# Patient Record
Sex: FEMALE | Race: WHITE | NOT HISPANIC OR LATINO | URBAN - NONMETROPOLITAN AREA
[De-identification: names, ages, dates, MRNs, and addresses within clinical notes are randomized per-mention and may not be internally consistent; named-entity substitution may affect disease eponyms.]

---

## 2024-09-30 ENCOUNTER — LAB OUTSIDE AN ENCOUNTER (OUTPATIENT)
Dept: URBAN - NONMETROPOLITAN AREA CLINIC 2 | Facility: CLINIC | Age: 69
End: 2024-09-30

## 2024-09-30 ENCOUNTER — OFFICE VISIT (OUTPATIENT)
Dept: URBAN - NONMETROPOLITAN AREA CLINIC 2 | Facility: CLINIC | Age: 69
End: 2024-09-30

## 2024-09-30 ENCOUNTER — DASHBOARD ENCOUNTERS (OUTPATIENT)
Age: 69
End: 2024-09-30

## 2024-09-30 VITALS
HEART RATE: 70 BPM | HEIGHT: 70 IN | BODY MASS INDEX: 20.9 KG/M2 | SYSTOLIC BLOOD PRESSURE: 137 MMHG | DIASTOLIC BLOOD PRESSURE: 81 MMHG | WEIGHT: 146 LBS | TEMPERATURE: 98 F

## 2024-09-30 PROBLEM — 85057007: Status: ACTIVE | Noted: 2024-09-30

## 2024-09-30 PROBLEM — 16331000: Status: ACTIVE | Noted: 2024-09-30

## 2024-09-30 PROBLEM — 31258000: Status: ACTIVE | Noted: 2024-09-30

## 2024-09-30 PROBLEM — 305058001: Status: ACTIVE | Noted: 2024-09-30

## 2024-09-30 PROBLEM — 116289008: Status: ACTIVE | Noted: 2024-09-30

## 2024-09-30 RX ORDER — PHENOL 1.4 %
TAKE 1 TABLET BY ORAL ROUTE AS NEEDED AEROSOL, SPRAY (ML) MUCOUS MEMBRANE
Qty: 0 | Refills: 0 | Status: ACTIVE | COMMUNITY
Start: 1900-01-01

## 2024-09-30 RX ORDER — ESCITALOPRAM OXALATE 10 MG/1
1 TABLET TABLET ORAL ONCE A DAY
Status: ACTIVE | COMMUNITY

## 2024-09-30 RX ORDER — LORATADINE 10 MG
1 TABLET TABLET ORAL ONCE A DAY
Status: ACTIVE | COMMUNITY

## 2024-09-30 RX ORDER — ROSUVASTATIN CALCIUM 5 MG/1
1 TABLET TABLET, COATED ORAL ONCE A DAY
Status: ACTIVE | COMMUNITY

## 2024-09-30 NOTE — HPI-TODAY'S VISIT:
9/30/2024 Alexandre presents to clinic for evaluation of heartburn, increased gas and bloating.  This started a few months ago.  She notes increased excessive belching abdominal bloating.  She has occasional heartburn and has tried over-the-counter Gas-X, probiotics and digestive enzymes.  She was started on famotidine with her PCP approximately a week ago and has not noted any symptom improvement.  Her clothing does not fit like it used to and she reports weight gain.  She also notes recent change in her bowel habits with less stool production.  She still at her baseline every other day bowel movement however she has less output.  She purchased a squatty potty and did not observe any improvement.  She has also been using Metamucil. She denies blood in her stool, dysphagia, reflux.  She states she was told in the past she had a hiatal hernia and questions any involvement.  She does endorse enjoyment of dairy products in her diet.  She denies use of artificial sweeteners any travel or illnesses in the last year.  She has not had any medication changes.  She completed lab work with PCP which showed subclinical hypothyroidism. Based the abdominal bloating along with on some right lower quadrant/pelvis discomfort she completed an MRI of her abdomen and pelvis with her PCP, showing inguinal hernia and incidental findings of pancreatic cyst and liver lesion. She is due to repeat her screening colonoscopy next year, previously completed EGD and colonoscopy with Dr. Benton. Today we discussed moving forward with an EGD with biopsies to rule out H. pylori. She will continue famotidine and start MiraLAX and 2 tablets of psyllium fiber at bedtime, trial Lactaid with dairy. TAMIKA

## 2024-10-28 ENCOUNTER — OFFICE VISIT (OUTPATIENT)
Dept: URBAN - NONMETROPOLITAN AREA SURGERY CENTER 1 | Facility: SURGERY CENTER | Age: 69
End: 2024-10-28

## 2024-10-28 ENCOUNTER — TELEPHONE ENCOUNTER (OUTPATIENT)
Dept: URBAN - NONMETROPOLITAN AREA CLINIC 2 | Facility: CLINIC | Age: 69
End: 2024-10-28

## 2024-10-28 RX ORDER — ROSUVASTATIN CALCIUM 5 MG/1
1 TABLET TABLET, COATED ORAL ONCE A DAY
Status: ACTIVE | COMMUNITY

## 2024-10-28 RX ORDER — PHENOL 1.4 %
TAKE 1 TABLET BY ORAL ROUTE AS NEEDED AEROSOL, SPRAY (ML) MUCOUS MEMBRANE
Qty: 0 | Refills: 0 | Status: ACTIVE | COMMUNITY
Start: 1900-01-01

## 2024-10-28 RX ORDER — LORATADINE 10 MG
1 TABLET TABLET ORAL ONCE A DAY
Status: ACTIVE | COMMUNITY

## 2024-10-28 RX ORDER — ESCITALOPRAM OXALATE 10 MG/1
1 TABLET TABLET ORAL ONCE A DAY
Status: ACTIVE | COMMUNITY

## 2024-10-28 RX ORDER — PANTOPRAZOLE SODIUM 20 MG/1
1 TABLET TABLET, DELAYED RELEASE ORAL ONCE A DAY
Qty: 90 TABLET | Refills: 3 | OUTPATIENT
Start: 2024-10-28

## 2024-11-25 ENCOUNTER — LAB OUTSIDE AN ENCOUNTER (OUTPATIENT)
Dept: URBAN - NONMETROPOLITAN AREA CLINIC 2 | Facility: CLINIC | Age: 69
End: 2024-11-25

## 2024-11-25 ENCOUNTER — OFFICE VISIT (OUTPATIENT)
Dept: URBAN - NONMETROPOLITAN AREA CLINIC 2 | Facility: CLINIC | Age: 69
End: 2024-11-25
Payer: MEDICARE

## 2024-11-25 VITALS
HEIGHT: 70 IN | TEMPERATURE: 98 F | DIASTOLIC BLOOD PRESSURE: 84 MMHG | SYSTOLIC BLOOD PRESSURE: 144 MMHG | WEIGHT: 144 LBS | BODY MASS INDEX: 20.62 KG/M2 | HEART RATE: 67 BPM

## 2024-11-25 DIAGNOSIS — K21.00 GASTROESOPHAGEAL REFLUX DISEASE WITH ESOPHAGITIS WITHOUT HEMORRHAGE: ICD-10-CM

## 2024-11-25 DIAGNOSIS — R19.4 CHANGE IN BOWEL HABITS: ICD-10-CM

## 2024-11-25 DIAGNOSIS — K44.9 HIATAL HERNIA: ICD-10-CM

## 2024-11-25 DIAGNOSIS — K76.89 LIVER CYST: ICD-10-CM

## 2024-11-25 DIAGNOSIS — K86.2 PANCREATIC CYST: ICD-10-CM

## 2024-11-25 DIAGNOSIS — R10.13 ABDOMINAL PAIN, EPIGASTRIC: ICD-10-CM

## 2024-11-25 DIAGNOSIS — Z12.11 SCREENING FOR COLON CANCER: ICD-10-CM

## 2024-11-25 DIAGNOSIS — R12 HEARTBURN: ICD-10-CM

## 2024-11-25 DIAGNOSIS — R14.0 ABDOMINAL BLOATING: ICD-10-CM

## 2024-11-25 PROBLEM — 88111009: Status: ACTIVE | Noted: 2024-11-25

## 2024-11-25 PROBLEM — 266433003: Status: ACTIVE | Noted: 2024-11-25

## 2024-11-25 PROBLEM — 84089009: Status: ACTIVE | Noted: 2024-11-25

## 2024-11-25 PROCEDURE — 99214 OFFICE O/P EST MOD 30 MIN: CPT

## 2024-11-25 RX ORDER — LORATADINE 10 MG
1 TABLET TABLET ORAL ONCE A DAY
Status: ACTIVE | COMMUNITY

## 2024-11-25 RX ORDER — PHENOL 1.4 %
TAKE 1 TABLET BY ORAL ROUTE AS NEEDED AEROSOL, SPRAY (ML) MUCOUS MEMBRANE
Qty: 0 | Refills: 0 | Status: ACTIVE | COMMUNITY
Start: 1900-01-01

## 2024-11-25 RX ORDER — ESCITALOPRAM OXALATE 10 MG/1
1 TABLET TABLET ORAL ONCE A DAY
Status: ACTIVE | COMMUNITY

## 2024-11-25 RX ORDER — PANTOPRAZOLE SODIUM 20 MG/1
1 TABLET TABLET, DELAYED RELEASE ORAL ONCE A DAY
Qty: 90 TABLET | Refills: 3 | Status: ACTIVE | COMMUNITY
Start: 2024-10-28

## 2024-11-25 RX ORDER — PANTOPRAZOLE SODIUM 20 MG/1
1 TABLET TABLET, DELAYED RELEASE ORAL ONCE A DAY
Qty: 90 TABLET | Refills: 3 | OUTPATIENT

## 2024-11-25 RX ORDER — ROSUVASTATIN CALCIUM 5 MG/1
1 TABLET TABLET, COATED ORAL ONCE A DAY
Status: ACTIVE | COMMUNITY

## 2024-11-25 NOTE — HPI-TODAY'S VISIT:
9/30/2024 Alexandre presents to clinic for evaluation of heartburn, increased gas and bloating.  This started a few months ago.  She notes increased excessive belching abdominal bloating.  She has occasional heartburn and has tried over-the-counter Gas-X, probiotics and digestive enzymes.  She was started on famotidine with her PCP approximately a week ago and has not noted any symptom improvement.  Her clothing does not fit like it used to and she reports weight gain.  She also notes recent change in her bowel habits with less stool production.  She still at her baseline every other day bowel movement however she has less output.  She purchased a squatty potty and did not observe any improvement.  She has also been using Metamucil. She denies blood in her stool, dysphagia, reflux.  She states she was told in the past she had a hiatal hernia and questions any involvement.  She does endorse enjoyment of dairy products in her diet.  She denies use of artificial sweeteners any travel or illnesses in the last year.  She has not had any medication changes.  She completed lab work with PCP which showed subclinical hypothyroidism. Based the abdominal bloating along with on some right lower quadrant/pelvis discomfort she completed an MRI of her abdomen and pelvis with her PCP, showing inguinal hernia and incidental findings of pancreatic cyst and liver lesion. She is due to repeat her screening colonoscopy next year, previously completed EGD and colonoscopy with Dr. Benton. Today we discussed moving forward with an EGD with biopsies to rule out H. pylori. She will continue famotidine and start MiraLAX and 2 tablets of psyllium fiber at bedtime, trial Lactaid with dairy. SP 11/25/2024 Teagan returns to clinic for follow-up after her EGD with Dr. Mcgarry on 10/28/2024 with findings of LA grade a esophagitis, irregular Z-line, 5 cm hiatal hernia, a few Morris ulcers, diffuse moderate gastritis.  She was started on Protonix daily and stop famotidine.  Her biopsies resulted with chemical reactive gastropathy and reflux type changes on her esophagus. Today she states she has had significant improvement in her GI symptoms of abdominal pain, bloating, heartburn.  Her bowel habits have improved as well following starting a bowel regimen of MiraLAX and fiber.  Today she notes she is observing skinnier stools.  Her weight is stable and she has no blood in her stools.  Prior to starting her bowel regimen she did have firmer stools.  Her stools are now softer.  She is close to her recommended repeat colonoscopy to screen for colon cancer.  We discussed that she may titrate her bowel regimen with increased fiber and observe for improvement. She also has questions regarding her pancreatic cyst, states she has a follow-up MRI already scheduled with another provider and has reviewed this with the family member who has a medical background and explained that her cyst is very small.  This was reassuring to her. Otherwise she is doing well and agrees to schedule her repeat colonoscopy with Dr. Mcgarry today.  She will continue pantoprazole 20 mg daily. SP

## 2024-11-25 NOTE — PHYSICAL EXAM NEUROLOGIC:
oriented to person, place and time ,  , cranial nerves 2-12 grossly intact
Quality 130: Documentation Of Current Medications In The Medical Record: Current Medications Documented
Quality 47: Advance Care Plan: Advance Care Planning discussed and documented; advance care plan or surrogate decision maker documented in the medical record.
Detail Level: Detailed
Quality 226: Preventive Care And Screening: Tobacco Use: Screening And Cessation Intervention: Patient screened for tobacco use and is an ex/non-smoker

## 2025-02-21 ENCOUNTER — CLAIMS CREATED FROM THE CLAIM WINDOW (OUTPATIENT)
Dept: URBAN - METROPOLITAN AREA CLINIC 4 | Facility: CLINIC | Age: 70
End: 2025-02-21
Payer: MEDICARE

## 2025-02-21 ENCOUNTER — CLAIMS CREATED FROM THE CLAIM WINDOW (OUTPATIENT)
Dept: URBAN - NONMETROPOLITAN AREA SURGERY CENTER 1 | Facility: SURGERY CENTER | Age: 70
End: 2025-02-21
Payer: MEDICARE

## 2025-02-21 DIAGNOSIS — Z12.11 COLON CANCER SCREENING: ICD-10-CM

## 2025-02-21 DIAGNOSIS — K63.5 BENIGN COLON POLYP: ICD-10-CM

## 2025-02-21 DIAGNOSIS — K63.5 HYPERPLASTIC POLYP OF DESCENDING COLON: ICD-10-CM

## 2025-02-21 DIAGNOSIS — K57.30 DIVERTICULOSIS OF COLON: ICD-10-CM

## 2025-02-21 DIAGNOSIS — K63.5 POLYP OF COLON: ICD-10-CM

## 2025-02-21 DIAGNOSIS — Z12.11 ENCOUNTER SCREENING FOR MALIGNANT NEOPLASM OF COLON: ICD-10-CM

## 2025-02-21 PROCEDURE — 45385 COLONOSCOPY W/LESION REMOVAL: CPT | Performed by: INTERNAL MEDICINE

## 2025-02-21 PROCEDURE — 00811 ANES LWR INTST NDSC NOS: CPT | Performed by: NURSE ANESTHETIST, CERTIFIED REGISTERED

## 2025-02-21 PROCEDURE — 88305 TISSUE EXAM BY PATHOLOGIST: CPT | Performed by: PATHOLOGY

## 2025-02-21 RX ORDER — PHENOL 1.4 %
TAKE 1 TABLET BY ORAL ROUTE AS NEEDED AEROSOL, SPRAY (ML) MUCOUS MEMBRANE
Qty: 0 | Refills: 0 | Status: ACTIVE | COMMUNITY
Start: 1900-01-01

## 2025-02-21 RX ORDER — LORATADINE 10 MG
1 TABLET TABLET ORAL ONCE A DAY
Status: ACTIVE | COMMUNITY

## 2025-02-21 RX ORDER — ESCITALOPRAM OXALATE 10 MG/1
1 TABLET TABLET ORAL ONCE A DAY
Status: ACTIVE | COMMUNITY

## 2025-02-21 RX ORDER — ROSUVASTATIN CALCIUM 5 MG/1
1 TABLET TABLET, COATED ORAL ONCE A DAY
Status: ACTIVE | COMMUNITY

## 2025-02-21 RX ORDER — PANTOPRAZOLE SODIUM 20 MG/1
1 TABLET TABLET, DELAYED RELEASE ORAL ONCE A DAY
Qty: 90 TABLET | Refills: 3 | Status: ACTIVE | COMMUNITY

## 2025-03-07 ENCOUNTER — TELEPHONE ENCOUNTER (OUTPATIENT)
Dept: URBAN - NONMETROPOLITAN AREA CLINIC 2 | Facility: CLINIC | Age: 70
End: 2025-03-07

## 2025-04-07 ENCOUNTER — OFFICE VISIT (OUTPATIENT)
Dept: URBAN - NONMETROPOLITAN AREA CLINIC 2 | Facility: CLINIC | Age: 70
End: 2025-04-07
Payer: MEDICARE

## 2025-04-07 DIAGNOSIS — K76.89 LIVER CYST: ICD-10-CM

## 2025-04-07 DIAGNOSIS — K44.9 HIATAL HERNIA: ICD-10-CM

## 2025-04-07 DIAGNOSIS — R10.13 ABDOMINAL PAIN, EPIGASTRIC: ICD-10-CM

## 2025-04-07 DIAGNOSIS — K86.2 PANCREATIC CYST: ICD-10-CM

## 2025-04-07 DIAGNOSIS — R19.4 CHANGE IN BOWEL HABITS: ICD-10-CM

## 2025-04-07 DIAGNOSIS — R12 HEARTBURN: ICD-10-CM

## 2025-04-07 DIAGNOSIS — K21.00 GASTROESOPHAGEAL REFLUX DISEASE WITH ESOPHAGITIS WITHOUT HEMORRHAGE: ICD-10-CM

## 2025-04-07 DIAGNOSIS — Z12.11 SCREENING FOR COLON CANCER: ICD-10-CM

## 2025-04-07 PROCEDURE — 99214 OFFICE O/P EST MOD 30 MIN: CPT

## 2025-04-07 RX ORDER — ESCITALOPRAM OXALATE 10 MG/1
1 TABLET TABLET ORAL ONCE A DAY
Status: ACTIVE | COMMUNITY

## 2025-04-07 RX ORDER — PANTOPRAZOLE SODIUM 20 MG/1
1 TABLET TABLET, DELAYED RELEASE ORAL ONCE A DAY
Qty: 90 TABLET | Refills: 3 | OUTPATIENT
Start: 2025-04-07

## 2025-04-07 RX ORDER — FAMOTIDINE 20 MG/1
1 TABLET TABLET, FILM COATED ORAL TWICE A DAY
Qty: 180 TABLET | Refills: 3 | OUTPATIENT
Start: 2025-04-07

## 2025-04-07 RX ORDER — PHENOL 1.4 %
TAKE 1 TABLET BY ORAL ROUTE AS NEEDED AEROSOL, SPRAY (ML) MUCOUS MEMBRANE
Qty: 0 | Refills: 0 | Status: ACTIVE | COMMUNITY
Start: 1900-01-01

## 2025-04-07 RX ORDER — LORATADINE 10 MG
1 TABLET TABLET ORAL ONCE A DAY
Status: ACTIVE | COMMUNITY

## 2025-04-07 RX ORDER — PANTOPRAZOLE SODIUM 20 MG/1
1 TABLET TABLET, DELAYED RELEASE ORAL ONCE A DAY
Qty: 90 TABLET | Refills: 3 | Status: ACTIVE | COMMUNITY

## 2025-04-07 RX ORDER — ROSUVASTATIN CALCIUM 5 MG/1
1 TABLET TABLET, COATED ORAL ONCE A DAY
Status: ACTIVE | COMMUNITY

## 2025-04-07 NOTE — HPI-TODAY'S VISIT:
9/30/2024 Alexandre presents to clinic for evaluation of heartburn, increased gas and bloating.  This started a few months ago.  She notes increased excessive belching abdominal bloating.  She has occasional heartburn and has tried over-the-counter Gas-X, probiotics and digestive enzymes.  She was started on famotidine with her PCP approximately a week ago and has not noted any symptom improvement.  Her clothing does not fit like it used to and she reports weight gain.  She also notes recent change in her bowel habits with less stool production.  She still at her baseline every other day bowel movement however she has less output.  She purchased a squatty potty and did not observe any improvement.  She has also been using Metamucil. She denies blood in her stool, dysphagia, reflux.  She states she was told in the past she had a hiatal hernia and questions any involvement.  She does endorse enjoyment of dairy products in her diet.  She denies use of artificial sweeteners any travel or illnesses in the last year.  She has not had any medication changes.  She completed lab work with PCP which showed subclinical hypothyroidism. Based the abdominal bloating along with on some right lower quadrant/pelvis discomfort she completed an MRI of her abdomen and pelvis with her PCP, showing inguinal hernia and incidental findings of pancreatic cyst and liver lesion. She is due to repeat her screening colonoscopy next year, previously completed EGD and colonoscopy with Dr. Benton. Today we discussed moving forward with an EGD with biopsies to rule out H. pylori. She will continue famotidine and start MiraLAX and 2 tablets of psyllium fiber at bedtime, trial Lactaid with dairy. SP 11/25/2024 Teagan returns to clinic for follow-up after her EGD with Dr. Mcgarry on 10/28/2024 with findings of LA grade a esophagitis, irregular Z-line, 5 cm hiatal hernia, a few Morris ulcers, diffuse moderate gastritis.  She was started on Protonix daily and stop famotidine.  Her biopsies resulted with chemical reactive gastropathy and reflux type changes on her esophagus. Today she states she has had significant improvement in her GI symptoms of abdominal pain, bloating, heartburn.  Her bowel habits have improved as well following starting a bowel regimen of MiraLAX and fiber.  Today she notes she is observing skinnier stools.  Her weight is stable and she has no blood in her stools.  Prior to starting her bowel regimen she did have firmer stools.  Her stools are now softer.  She is close to her recommended repeat colonoscopy to screen for colon cancer.  We discussed that she may titrate her bowel regimen with increased fiber and observe for improvement. She also has questions regarding her pancreatic cyst, states she has a follow-up MRI already scheduled with another provider and has reviewed this with the family member who has a medical background and explained that her cyst is very small.  This was reassuring to her. Otherwise she is doing well and agrees to schedule her repeat colonoscopy with Dr. Mcgarry today.  She will continue pantoprazole 20 mg daily. SP  4/7/2025 Patient returns clinic for follow-up.  She is found she has great results regarding her bowel habits taking MiraLAX and fiber.  She continues pantoprazole which covers her heartburn symptoms.  She is interested in attempting to wean.  We discussed plan for weaning, risk versus benefits of PPI therapy.  We discussed any role or utility for repeat EGD. She has no dysphagia, abdominal pain, blood in her stool.  She feels quite stable from a GI standpoint today. SP

## 2025-06-24 ENCOUNTER — OFFICE VISIT (OUTPATIENT)
Dept: URBAN - NONMETROPOLITAN AREA CLINIC 2 | Facility: CLINIC | Age: 70
End: 2025-06-24
Payer: MEDICARE

## 2025-06-24 DIAGNOSIS — K86.2 PANCREATIC CYST: ICD-10-CM

## 2025-06-24 DIAGNOSIS — Z12.11 SCREENING FOR COLON CANCER: ICD-10-CM

## 2025-06-24 DIAGNOSIS — K76.89 LIVER CYST: ICD-10-CM

## 2025-06-24 DIAGNOSIS — K21.00 GASTROESOPHAGEAL REFLUX DISEASE WITH ESOPHAGITIS WITHOUT HEMORRHAGE: ICD-10-CM

## 2025-06-24 DIAGNOSIS — M85.80 OSTEOPENIA, UNSPECIFIED LOCATION: ICD-10-CM

## 2025-06-24 DIAGNOSIS — R19.4 CHANGE IN BOWEL HABITS: ICD-10-CM

## 2025-06-24 DIAGNOSIS — R10.13 ABDOMINAL PAIN, EPIGASTRIC: ICD-10-CM

## 2025-06-24 DIAGNOSIS — R12 HEARTBURN: ICD-10-CM

## 2025-06-24 DIAGNOSIS — K44.9 HIATAL HERNIA: ICD-10-CM

## 2025-06-24 PROBLEM — 312894000: Status: ACTIVE | Noted: 2025-06-24

## 2025-06-24 PROCEDURE — 99212 OFFICE O/P EST SF 10 MIN: CPT

## 2025-06-24 RX ORDER — ROSUVASTATIN CALCIUM 5 MG/1
1 TABLET TABLET, COATED ORAL ONCE A DAY
Status: ACTIVE | COMMUNITY

## 2025-06-24 RX ORDER — LORATADINE 10 MG
1 TABLET TABLET ORAL ONCE A DAY
Status: ACTIVE | COMMUNITY

## 2025-06-24 RX ORDER — ESCITALOPRAM OXALATE 10 MG/1
1 TABLET TABLET ORAL ONCE A DAY
Status: ACTIVE | COMMUNITY

## 2025-06-24 RX ORDER — PANTOPRAZOLE SODIUM 20 MG/1
1 TABLET TABLET, DELAYED RELEASE ORAL ONCE A DAY
Qty: 90 TABLET | Refills: 3 | Status: ACTIVE | COMMUNITY
Start: 2025-04-07

## 2025-06-24 RX ORDER — FAMOTIDINE 20 MG/1
1 TABLET TABLET, FILM COATED ORAL TWICE A DAY
Qty: 180 TABLET | Refills: 3 | OUTPATIENT
Start: 2025-06-24

## 2025-06-24 RX ORDER — FAMOTIDINE 20 MG/1
1 TABLET TABLET, FILM COATED ORAL TWICE A DAY
Qty: 180 TABLET | Refills: 3 | Status: ACTIVE | COMMUNITY
Start: 2025-04-07

## 2025-06-24 RX ORDER — PANTOPRAZOLE SODIUM 20 MG/1
1 TABLET TABLET, DELAYED RELEASE ORAL ONCE A DAY
Qty: 90 TABLET | Refills: 3 | OUTPATIENT
Start: 2025-06-24

## 2025-06-24 RX ORDER — PHENOL 1.4 %
TAKE 1 TABLET BY ORAL ROUTE AS NEEDED AEROSOL, SPRAY (ML) MUCOUS MEMBRANE
Qty: 0 | Refills: 0 | Status: ACTIVE | COMMUNITY
Start: 1900-01-01

## 2025-06-24 NOTE — HPI-TODAY'S VISIT:
9/30/2024 Alexandre presents to clinic for evaluation of heartburn, increased gas and bloating.  This started a few months ago.  She notes increased excessive belching abdominal bloating.  She has occasional heartburn and has tried over-the-counter Gas-X, probiotics and digestive enzymes.  She was started on famotidine with her PCP approximately a week ago and has not noted any symptom improvement.  Her clothing does not fit like it used to and she reports weight gain.  She also notes recent change in her bowel habits with less stool production.  She still at her baseline every other day bowel movement however she has less output.  She purchased a squatty potty and did not observe any improvement.  She has also been using Metamucil. She denies blood in her stool, dysphagia, reflux.  She states she was told in the past she had a hiatal hernia and questions any involvement.  She does endorse enjoyment of dairy products in her diet.  She denies use of artificial sweeteners any travel or illnesses in the last year.  She has not had any medication changes.  She completed lab work with PCP which showed subclinical hypothyroidism. Based the abdominal bloating along with on some right lower quadrant/pelvis discomfort she completed an MRI of her abdomen and pelvis with her PCP, showing inguinal hernia and incidental findings of pancreatic cyst and liver lesion. She is due to repeat her screening colonoscopy next year, previously completed EGD and colonoscopy with Dr. Benton. Today we discussed moving forward with an EGD with biopsies to rule out H. pylori. She will continue famotidine and start MiraLAX and 2 tablets of psyllium fiber at bedtime, trial Lactaid with dairy. SP 11/25/2024 Teagan returns to clinic for follow-up after her EGD with Dr. Mcgarry on 10/28/2024 with findings of LA grade a esophagitis, irregular Z-line, 5 cm hiatal hernia, a few Morris ulcers, diffuse moderate gastritis.  She was started on Protonix daily and stop famotidine.  Her biopsies resulted with chemical reactive gastropathy and reflux type changes on her esophagus. Today she states she has had significant improvement in her GI symptoms of abdominal pain, bloating, heartburn.  Her bowel habits have improved as well following starting a bowel regimen of MiraLAX and fiber.  Today she notes she is observing skinnier stools.  Her weight is stable and she has no blood in her stools.  Prior to starting her bowel regimen she did have firmer stools.  Her stools are now softer.  She is close to her recommended repeat colonoscopy to screen for colon cancer.  We discussed that she may titrate her bowel regimen with increased fiber and observe for improvement. She also has questions regarding her pancreatic cyst, states she has a follow-up MRI already scheduled with another provider and has reviewed this with the family member who has a medical background and explained that her cyst is very small.  This was reassuring to her. Otherwise she is doing well and agrees to schedule her repeat colonoscopy with Dr. Mcgarry today.  She will continue pantoprazole 20 mg daily. SP  4/7/2025 Patient returns clinic for follow-up.  She is found she has great results regarding her bowel habits taking MiraLAX and fiber.  She continues pantoprazole which covers her heartburn symptoms.  She is interested in attempting to wean.  We discussed plan for weaning, risk versus benefits of PPI therapy.  We discussed any role or utility for repeat EGD. She has no dysphagia, abdominal pain, blood in her stool.  She feels quite stable from a GI standpoint today. SP 6/24/2025 Patient returns clinic for follow-up with a history of heartburn, hiatal hernia, GERD, pancreatic cyst, liver cyst, abdominal bloating.  She continues on bowel regimen of MiraLAX and fiber.  This is working well for her with regular stooling.  Her heartburn symptoms are well covered with famotidine.  She feels reflux.  She feels she was able to wean off of PPI and feels maintained.  She has no epigastric pain.  She feels stable from a GI standpoint.  Her follow-up MRI for her pancreatic cyst is pending scheduled ahead.  She still has skinnier stools but has no pain with defecation or incomplete emptying sensation.  We discussed increasing fiber and will also trial a short course of hemorrhoid suppositories for internal hemorrhoid resolution.  She feels stable to follow-up in 4 months request to return to [facility] NP. She does feel that some of her symptoms were linked to the gut brain connection with increased stress with caring for her  with Parkinson's.  She is working on self-care and time for herself. TAMIKA

## 2025-08-04 ENCOUNTER — OFFICE VISIT (OUTPATIENT)
Age: 70
End: 2025-08-04